# Patient Record
Sex: FEMALE | Race: WHITE | NOT HISPANIC OR LATINO | Employment: OTHER | URBAN - METROPOLITAN AREA
[De-identification: names, ages, dates, MRNs, and addresses within clinical notes are randomized per-mention and may not be internally consistent; named-entity substitution may affect disease eponyms.]

---

## 2020-10-26 ENCOUNTER — TRANSFERRED RECORDS (OUTPATIENT)
Dept: HEALTH INFORMATION MANAGEMENT | Facility: CLINIC | Age: 75
End: 2020-10-26

## 2022-01-10 ENCOUNTER — TRANSCRIBE ORDERS (OUTPATIENT)
Dept: OTHER | Age: 77
End: 2022-01-10

## 2022-01-10 DIAGNOSIS — H35.3190 DRY AGE-RELATED MACULAR DEGENERATION: Primary | ICD-10-CM

## 2022-06-02 ENCOUNTER — HOSPITAL ENCOUNTER (OUTPATIENT)
Dept: OCCUPATIONAL THERAPY | Facility: CLINIC | Age: 77
Setting detail: THERAPIES SERIES
Discharge: HOME | End: 2022-06-02
Attending: OPHTHALMOLOGY
Payer: MEDICARE

## 2022-06-02 DIAGNOSIS — H35.3190 DRY AGE-RELATED MACULAR DEGENERATION: ICD-10-CM

## 2022-06-02 PROCEDURE — 97166 OT EVAL MOD COMPLEX 45 MIN: CPT | Mod: GO | Performed by: OCCUPATIONAL THERAPIST

## 2022-06-02 PROCEDURE — 97535 SELF CARE MNGMENT TRAINING: CPT | Mod: GO | Performed by: OCCUPATIONAL THERAPIST

## 2022-06-02 ASSESSMENT — VISUAL ACUITY
OS: 20/80
OD: 20/50 +1

## 2022-06-02 NOTE — PROGRESS NOTES
[facility]          OUTPATIENT OCCUPATIONALTHERAPY  EVALUATION  PLAN OF TREATMENT FOR OUTPATIENT REHABILITATION  (COMPLETE FOR INITIAL CLAIMS ONLY)  Patient's Last Name, First Name, M.I.  YOB: 1945  Melissa Huynhjoyusra MORRISSEY      Provider's Name  [facility] Medical Record No.  [id number]   Onset Date:  1/10/22 - date of order Start of Care Date:  06/02/22   Type:     ___PT  _X_OT   ___SLP Medical Diagnosis:  Dry age-related macular degeneration (H35.3190)  - Primary   Therapy Diagnosis: Impaired ADL/IADL with deficits in Reading based ADL, Written communication, Home management, Financial management, Dressing, Grooming, Eating, Work performance (mobility)  Decreased vision, decreased functional and community mobility, tremor upper extremity Visits from SOC: 1     _________________________________________________________________________________  Plan of Treatment/Functional Goals:  Planned Interventions: Scotoma awareness, Visual skills training for near tasks, Visual skills training for safety in mobility, Low vision compensatory training for reading, Low vision compensatory training for written communication, Low vision compensatory trainging for financial management, Low vision compensatory training for object identification, Instruction in environmental adaptations for glare, Instruction in environmental adaptations for contrast, Instruction in environmental adaptations for lighting, Optical device/ADL device instruction and training, Computer modifications, Instruction in community resources        Goals  1.      Patient will demonstrate 3 pieces of adaptive equipment and/or adaptive techniques in regards to magnification, lighting, contrast and glare for increased ADL/IADL independence with near vision tasks (reading, meal prep, medication management, writing).     Target Date: 12/02/22      2.                  3.       Patient will demonstrate and/or verbalize 3 environmentally-based ADL modifications to improve visual-based ADL/IADL activities.    Target Date: 12/02/22      4. Patient will verbalize awareness of community resources for the following:, Audio access to print materials, Access to large print materials, Access to low vision devices, Support in vocational goals         Target Date: 12/02/22      5.       Pt will demonstrate increased independence in distance viewing for safety and leisure during ADL/IADLs through independent use of at least one adaptive technique or AE.    Target Date: 12/02/22      6.                    7.                 8.                            Therapy Frequency/Duration:  1x/wk, decreasing frequency as indicated x6 months (extended goal date due to scheduling conflicts)    Mabel Fung OT       I CERTIFY THE NEED FOR THESE SERVICES FURNISHED UNDER        THIS PLAN OF TREATMENT AND WHILE UNDER MY CARE .             Physician Signature               Date    X_____________________________________________________                        Certification date from: 06/02/22 Certification date to: 08/31/22          Referring Physician: Corin Ellis MD     Initial Assessment        See Epic Evaluation Start Of Care Date: 06/02/22     Mabel Fung OT

## 2022-06-02 NOTE — PROGRESS NOTES
06/02/22 1400   Visit Type   Type of Visit Initial   General Information   Start Of Care Date 06/02/22   Referring Physician Corin Ellis MD   Orders Evaluate And Treat As Indicated   Orders Comment Low Vision   Date of Order 01/10/22   Medical Diagnosis Dry age-related macular degeneration (H35.3190)  - Primary   Onset Of Illness/injury Or Date Of Surgery 1/10/22 - date of order   Surgical/Medical history reviewed Yes  (but very limited in chart)   Precautions/Limitations falls   Additional Occupational Profile Info/Pertinent History of Current Problem arthritis per patient report and some UE tremor   Prior Status Comment Some physical limitations   Others present at visit Spouse/significant other   Patient/family Goals Statement Be able to read, 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 with greater ease, find adaptive equipment that would help her with daily tasks   General information comments Don, spouse, present for majority of session   Social History/Home Environment   Living Environment House/[facility]  (house with spouse)   Current Community Support Family/friend caregiver  (spouse and 2 kids - one local)   Patient Role/employment History  Retired;Employed  (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 Yes   Have you fallen and had an injury in the past year? No   Is patient a fall risk? Yes  (vision and physical)   Fall screen comments patient may be interested in physical therapy for balance after she deals with her eyes   Abuse Screen (yes response referral indicated)   Feels Unsafe at Home or Work/School no   Feels Threatened by Someone no   Cognitive/Behavioral   Communication Intact    Cognitive Status Intact for evaluation process   Behavior Appropriate   Physical Status/Equipment   Physical Status Impaired balance   Mobility equipment used Support cane   Visual Report   Functional Complaints Reading;Writing;Homemaking;Safety in mobility   Visual Complaints Difficulty maintaining focus;Double vision;Light sensitivity;Visual fatigue;Scotoma Hindrance right eye;Scotoma Hindrance left eye   Kevin Garces Symptoms? Yes   Magnifier (strength and type) handheld with light - not very helpful (hand shakes, etc.); floor lamp + magnifier combo   Reading glasses Bifocal  (separate glasses for computer: maybe +1.25 (not sure))   Technology Computer  (laptop with large monitor, smart phone, ipad)   Lighting and Glare   Is your lighting adequate? No/ at home   Is glare a problem? Yes/ indoors;Yes/ outdoors   Are you satisfied with your sunglasses? No   Sunglass filter color Gray  (medium gray)   Visual Acuity   Acuity right eye 20/50 +1   Acuity left eye 20/80   Acuity both eyes together +3.00 add, all per MD order on 1/10/22   Contrast Sensitivity   Contrast sensitivity (score/25) 5/25 (read line 3 from R to L)   Preferred Retinal Locus   Right eye   (clock face: missing R side of clock)   Right eye eccentric viewing position Right   Left eye   (clock face: only really seeing L side of clock)   Left eye eccentric viewing position Right  (but still not great)   [place] Read   Smallest print size read with bifocals: (Patient inconsistent, but primarily most difficult with largest and smallest font sizes): ambient light: 1.3M, task light: .8M   Critical print size 3.2M ambient light; task light: 1.3M   Words per minute at critical print size 55wpm ambient light; task light: 107wpm (but had already read this line); preferred print size: 1.6M   Clinical Impression, OT Eval   Criteria for Skilled Therapeutic Interventions Met yes;treatment indicated   Therapy  Diagnosis: Impaired ADL/IADL with deficits in Reading  based ADL;Written communication;Home management;Financial management;Dressing;Grooming;Eating;Work performance  (mobility)   Impairment comments Decreased vision, decreased functional and community mobility, tremor upper extremity   Assessment of Occupational Performance 5 or more Performance Deficits   Identified Performance Deficits Reading based ADL;Written communication;Home management;Financial management;Dressing;Grooming;Eating;Work performance;mobility   Clinical Decision Making (Complexity) Moderate complexity   OT Visual Rehabilitation Evaluation Plan   Therapy Plan Occupational therapy intervention   Planned Interventions Scotoma awareness;Visual skills training for near tasks;Visual skills training for safety in mobility;Low vision compensatory training for reading;Low vision compensatory training for written communication;Low vision compensatory trainging for financial management;Low vision compensatory training for object identification;Instruction in environmental adaptations for glare;Instruction in environmental adaptations for contrast;Instruction in environmental adaptations for lighting;Optical device/ADL device instruction and training;Computer modifications;Instruction in community resources   Frequency / Duration 1x/wk, decreasing frequency as indicated x6 months (extended goal date due to scheduling conflicts)   Risks and Benefits of Treatment have been explained. Yes   Patient, Family in agreement with plan of care Yes   GOALS   Goals Near Vision;Environmental Modification;Resource Education;Distance Viewing   Goal 1 - Near Vision   Near Vision Goal Comment Patient will demonstrate 3 pieces of adaptive equipment and/or adaptive techniques in regards to magnification, lighting, contrast and glare for increased ADL/IADL independence with near vision tasks (reading, meal prep, medication management, writing).   Target Date 12/02/22   Goal 3 - Environmental modification   Environmental  Modification Goal Comment Patient will demonstrate and/or verbalize 3 environmentally-based ADL modifications to improve visual-based ADL/IADL activities.   Target Date 12/02/22   Goal 4 - Resource education   Goal Description: Resource education Patient will verbalize awareness of community resources for the following:;Audio access to print materials;Access to large print materials;Access to low vision devices;Support in vocational goals   Target Date 12/02/22   Goal 5 - Distance viewing   Distance Viewing Goal Comment Pt will demonstrate increased independence in distance viewing for safety and leisure during ADL/IADLs through independent use of at least one adaptive technique or AE.   Target Date 12/02/22   Total Evaluation Time   OT Eval, Moderate Complexity Minutes (03356) 35   Therapy Certification   Certification date from 06/02/22   Certification date to 08/31/22   Medical Diagnosis Dry age-related macular degeneration (H35.3190)  - Primary   Certification I certify the need for these services furnished under this plan of treatment and while under my care.  (Physician co-signature of this document indicates review and certification of the therapy plan).

## 2022-06-24 ENCOUNTER — HOSPITAL ENCOUNTER (OUTPATIENT)
Dept: OCCUPATIONAL THERAPY | Facility: CLINIC | Age: 77
Setting detail: THERAPIES SERIES
Discharge: HOME | End: 2022-06-24
Attending: OPHTHALMOLOGY
Payer: MEDICARE

## 2022-06-24 PROCEDURE — 97535 SELF CARE MNGMENT TRAINING: CPT | Mod: GO | Performed by: OCCUPATIONAL THERAPIST

## 2022-07-01 ENCOUNTER — HOSPITAL ENCOUNTER (OUTPATIENT)
Dept: OCCUPATIONAL THERAPY | Facility: CLINIC | Age: 77
Setting detail: THERAPIES SERIES
Discharge: HOME | End: 2022-07-01
Attending: OPHTHALMOLOGY
Payer: MEDICARE

## 2022-07-01 PROCEDURE — 97535 SELF CARE MNGMENT TRAINING: CPT | Mod: GO | Performed by: OCCUPATIONAL THERAPIST

## 2022-07-07 ENCOUNTER — HOSPITAL ENCOUNTER (OUTPATIENT)
Dept: OCCUPATIONAL THERAPY | Facility: CLINIC | Age: 77
Setting detail: THERAPIES SERIES
Discharge: HOME | End: 2022-07-07
Attending: OPHTHALMOLOGY
Payer: MEDICARE

## 2022-07-07 PROCEDURE — 97535 SELF CARE MNGMENT TRAINING: CPT | Mod: GO | Performed by: OCCUPATIONAL THERAPIST

## 2022-07-22 ENCOUNTER — HOSPITAL ENCOUNTER (OUTPATIENT)
Dept: OCCUPATIONAL THERAPY | Facility: CLINIC | Age: 77
Setting detail: THERAPIES SERIES
Discharge: HOME | End: 2022-07-22
Attending: OPHTHALMOLOGY
Payer: MEDICARE

## 2022-07-22 PROCEDURE — 97535 SELF CARE MNGMENT TRAINING: CPT | Mod: GO | Performed by: OCCUPATIONAL THERAPIST

## 2022-09-02 ENCOUNTER — HOSPITAL ENCOUNTER (OUTPATIENT)
Dept: OCCUPATIONAL THERAPY | Facility: CLINIC | Age: 77
Setting detail: THERAPIES SERIES
Discharge: HOME | End: 2022-09-02
Attending: OPHTHALMOLOGY
Payer: MEDICARE

## 2022-09-02 PROCEDURE — 97535 SELF CARE MNGMENT TRAINING: CPT | Mod: GO | Performed by: OCCUPATIONAL THERAPIST

## 2022-09-02 NOTE — PROGRESS NOTES
[facility]    OUTPATIENT OCCUPATIONAL THERAPY  PLAN OF TREATMENT FOR OUTPATIENT REHABILITATION AND PROGRESS NOTE    Patient's Last Name, First Name, Latisha Russo Date of Birth  1945   Provider's Name  [facility] Medical Record No.  [id number]    Onset Date  1/10/22 - date of order Start of Care Date  06/02/22   Type:     __PT   _X_OT   __SLP Medical Diagnosis  Dry age-related macular degeneration (H35.3190)  - Primary   OT Diagnosis  Impaired ADL/IADL with deficits in Reading based ADL, Written communication, Home management, Financial management, Dressing, Grooming, Eating, Work performance (mobility)  Decreased vision, decreased functional and community mobility, tremor upper extremity Plan of Treatment  Frequency/Duration: 1x/wk, decreasing frequency as indicated x90 days   Certification date from 9/1/22 to 11/30/22     Goals/Progress/Plan: Patient seen for total of 6 treatment sessions thus far and making good progress towards all of her goals.  Full and detailed note will be completed after the 10th visit.  Plan of care remains appropriate.         I CERTIFY THE NEED FOR THESE SERVICES FURNISHED UNDER        THIS PLAN OF TREATMENT AND WHILE UNDER MY CARE .             Physician Signature               Date    X_____________________________________________________                      Referring Provider: Corin Ellis MD Amy Glaser, OT

## 2022-09-09 ENCOUNTER — HOSPITAL ENCOUNTER (OUTPATIENT)
Dept: OCCUPATIONAL THERAPY | Facility: CLINIC | Age: 77
Setting detail: THERAPIES SERIES
Discharge: HOME | End: 2022-09-09
Attending: OPHTHALMOLOGY
Payer: MEDICARE

## 2022-09-09 PROCEDURE — 97535 SELF CARE MNGMENT TRAINING: CPT | Mod: GO | Performed by: OCCUPATIONAL THERAPIST

## 2022-10-07 ENCOUNTER — HOSPITAL ENCOUNTER (OUTPATIENT)
Dept: OCCUPATIONAL THERAPY | Facility: CLINIC | Age: 77
Setting detail: THERAPIES SERIES
Discharge: HOME | End: 2022-10-07
Attending: OPHTHALMOLOGY
Payer: MEDICARE

## 2022-10-07 PROCEDURE — 97535 SELF CARE MNGMENT TRAINING: CPT | Mod: GO | Performed by: OCCUPATIONAL THERAPIST

## 2022-10-24 ENCOUNTER — HOSPITAL ENCOUNTER (OUTPATIENT)
Dept: OCCUPATIONAL THERAPY | Facility: CLINIC | Age: 77
Setting detail: THERAPIES SERIES
Discharge: HOME | End: 2022-10-24
Attending: OPHTHALMOLOGY
Payer: MEDICARE

## 2022-10-24 PROCEDURE — 97535 SELF CARE MNGMENT TRAINING: CPT | Mod: GO | Performed by: OCCUPATIONAL THERAPIST

## 2023-05-03 NOTE — PROGRESS NOTES
[facility]      OCCUPATIONAL THERAPY VISUAL REHABILITATION DISCHARGE SUMMARY     Patient: Schuyler Valentine  : 1945  Insurance:   Payer/Plan Subscriber Name Rel Member # Group #   MEDICARE - MEDICARE F* SCHUYLER VALENTINE Self [id number]       ATTN CLAIMS, [address]   MEDICA - MEDICA PRIME* SCHUYLER VALENTINE Self [id number]      [address] [address]       Beginning/End Dates of Reporting Period:  22 to 2023 (last seen on 10/24/22)    Therapy Diagnosis:    Dry age-related macular degeneration (H35.3190)  - Primary     Client Self Report: 10/24/22: Spouse present for session.  Patient leaving to go [facility] later this week and will be back for a short time in December, back to [place] April or May for the summer/.  Has an appointment with [facility] at 11:15 today for adaptive equipment    GOALS     Goal 1 - Near vision        Near Vision Goal Comment: Patient will demonstrate 3 pieces of adaptive equipment and/or adaptive techniques in regards to magnification, lighting, contrast and glare for increased ADL/IADL independence with near vision tasks (reading, meal prep, medication management, writing).   Target Date: 22   GOAL PROGRESS: There were additional adaptive equipment and strategies left to teach patient however, goal met.  Please see adaptive equipment/optical devices recommended below for details of education completed and recommendations made:     for reading: Audio books best, Reading books on ipad would be good, Next best: using your good lamp with the lap desk at appropriate distance - large print book best, Likely next best: CCTV; educated in and trial of "[employer] OCR (Optical Character Recognition) CCTV - 24  with great success for many tasks; Cell phone adaptations for greater visibility: zoom, turned on the dark mode which helped with less gray print on screen, strategy to get  the zoom controller back, skilled review and continued education in spoken content/screen reader; Attempted color filters on her phone and iPad as she has difficulty with blues and reds, however, black and white and other color filters not any better; Other options to look at phone: handheld magnifer or under future CCTV, rry a new stylus (hers not working) - can put bright colored electric tape on the end so can see easier where you are touching for contrast and visibility, spoken content - skilled review, ashly chris, AE for spot reading: hand held magnifier uses - relative distance, find sweet spot, etc.: she has a ~2x and a 3.5x handheld - trialed stronger for smaller rint and 5x handheld magnifier with light successful; skilled review of adaptive equipment and adaptive techniques for handwriting and new education in the following adaptive equipment: Bold lined paper spiral bound book (ideally 3/4), Writing guides (envelope, signature guide and envelope guide); resources of places to purchase adaptive equipment (low-vision store and [employer] and [employer]); education in stand magnifier that she has from [facility] and how to properly use including move the magnifier technique; laptop adaptations: may need to go back to smaller display size if too large and using magnifier well, magnifier skilled review and re-education and new education in read screen/speak feature of magnifier (play button to read from the top and specific play button to choose where to read)      Goal 2 - Visual field                     Goal 3 - Environmental modification        Environmental Modification Goal Comment: Patient will demonstrate and/or verbalize 3 environmentally-based ADL modifications to improve visual-based ADL/IADL activities.   Target Date: 12/02/22    GOAL PROGRESS: There were additional adaptive equipment and strategies left to teach patient however, goal met.  Please see adaptive equipment/optical devices recommended below for  details of education completed and recommendations made:    strategies For using credit cards: use the tap feature as much as you can.  Otherwise feeling for the slot is best/tactile; basic principles of lighting/lamps -various colors of light and brightness levels, will continue next session; High contrast measuring cups and spoons and contrast color cutting boards for safety in kitchen    Goal 4 - Resource education    Goal Description: Resource education: Patient will verbalize awareness of community resources for the following:, Audio access to print materials, Access to large print materials, Access to low vision devices, Support in vocational goals       Target Date: 12/02/22  GOAL PROGRESS: There were additional adaptive equipment and strategies left to teach patient however, goal met.  Please see adaptive equipment/optical devices recommended below for details of education completed and recommendations made:      Seeing  jovan (OCR); Be My Eyes jovan; [employer] Grid - how to complete and frequency, purpose of doing; education in dry vs. 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   Target Date: 12/02/22  GOAL PROGRESS: There were additional adaptive equipment and strategies left to teach patient however, goal met.  Please see adaptive equipment/optical devices recommended below for details of education completed and recommendations made:    IrisVision Goggles: successful for all levels of distance (without IRis: read line 3 (+2) with her glasses only at 5'  chart and with glasses and with Iris: read line 6 (-4) -for reading, computer, distance viewing all with good success.    Progress Toward Goals  Progress: Patient seen for a total of 9 sessions.    Reason for Discharge  Patient has failed to schedule further appointments.    Adaptive Equipment/Optical Devices Recommended:  see above for all    Discharge Plan  Patient to continue home program/techniques